# Patient Record
Sex: FEMALE | Race: BLACK OR AFRICAN AMERICAN | NOT HISPANIC OR LATINO | Employment: OTHER | URBAN - METROPOLITAN AREA
[De-identification: names, ages, dates, MRNs, and addresses within clinical notes are randomized per-mention and may not be internally consistent; named-entity substitution may affect disease eponyms.]

---

## 2025-03-08 ENCOUNTER — OFFICE VISIT (OUTPATIENT)
Dept: URGENT CARE | Age: 70
End: 2025-03-08
Payer: MEDICARE

## 2025-03-08 VITALS
RESPIRATION RATE: 20 BRPM | HEART RATE: 90 BPM | SYSTOLIC BLOOD PRESSURE: 142 MMHG | OXYGEN SATURATION: 99 % | TEMPERATURE: 97.2 F | DIASTOLIC BLOOD PRESSURE: 78 MMHG

## 2025-03-08 DIAGNOSIS — L03.011 CELLULITIS OF FINGER OF RIGHT HAND: ICD-10-CM

## 2025-03-08 DIAGNOSIS — L02.511 ABSCESS OF FINGER, RIGHT: Primary | ICD-10-CM

## 2025-03-08 RX ORDER — ATORVASTATIN CALCIUM 10 MG/1
10 TABLET, FILM COATED ORAL
COMMUNITY
Start: 2024-08-28 | End: 2025-09-03

## 2025-03-08 RX ORDER — DOXYCYCLINE 100 MG/1
100 CAPSULE ORAL 2 TIMES DAILY
Qty: 14 CAPSULE | Refills: 0 | Status: SHIPPED | OUTPATIENT
Start: 2025-03-08 | End: 2025-03-15

## 2025-03-08 RX ORDER — HYDROCHLOROTHIAZIDE 12.5 MG/1
12.5 CAPSULE ORAL
COMMUNITY
Start: 2024-03-15

## 2025-03-08 RX ORDER — MUPIROCIN 20 MG/G
OINTMENT TOPICAL
Qty: 22 G | Refills: 0 | Status: SHIPPED | OUTPATIENT
Start: 2025-03-08 | End: 2025-03-18

## 2025-03-08 RX ORDER — METFORMIN HYDROCHLORIDE 500 MG/1
1 TABLET ORAL
COMMUNITY
Start: 2024-05-20 | End: 2025-09-03

## 2025-03-08 RX ORDER — LOSARTAN POTASSIUM 100 MG/1
100 TABLET ORAL
COMMUNITY
Start: 2024-03-12 | End: 2025-09-03

## 2025-03-08 ASSESSMENT — ENCOUNTER SYMPTOMS
COLOR CHANGE: 1
FEVER: 0
CHILLS: 0

## 2025-03-08 NOTE — PROGRESS NOTES
Subjective   Patient ID: Ngoc Brown is a 70 y.o. female. They present today with a chief complaint of Finger Pain (X 1 day - pt has a sore on her rt pointer finger).    History of Present Illness  HPI    Patient presents to urgent care for complaints of a sore on her right pointer finger. Patient reports she had the sore for a couple of months. Reports yesterday she has had increasing redness and swelling around the sore. Denies any fever or chills.     Past Medical History  Allergies as of 03/08/2025 - Reviewed 03/08/2025   Allergen Reaction Noted    Amoxicillin Unknown 03/08/2025       (Not in a hospital admission)       Past Medical History:   Diagnosis Date    Acute embolism and thrombosis of unspecified deep veins of unspecified proximal lower extremity (Multi)     DVT of proximal lower limb    Personal history of other diseases of the circulatory system     History of hypertension       History reviewed. No pertinent surgical history.     reports that she has never smoked. She has never used smokeless tobacco.    Review of Systems  Review of Systems   Constitutional:  Negative for chills and fever.   Skin:  Positive for color change.                                  Objective    Vitals:    03/08/25 1717   BP: 142/78   Pulse: 90   Resp: 20   Temp: 36.2 °C (97.2 °F)   TempSrc: Temporal   SpO2: 99%     No LMP recorded. Patient is postmenopausal.    Physical Exam  Vitals reviewed.   Constitutional:       Appearance: Normal appearance.   Cardiovascular:      Rate and Rhythm: Normal rate and regular rhythm.      Heart sounds: Normal heart sounds.   Pulmonary:      Effort: Pulmonary effort is normal.      Breath sounds: Normal breath sounds.   Skin:     General: Skin is warm and dry.      Findings: Abscess (right pointer finger, may possible be a wart) present.      Comments: Erythema around sore and skin warm to the touch.    Neurological:      Mental Status: She is alert.         Procedures    Point of  Care Test & Imaging Results from this visit  No results found for this visit on 03/08/25.   No results found.    Diagnostic study results (if any) were reviewed by LI Campbell.    Assessment/Plan   Allergies, medications, history, and pertinent labs/EKGs/Imaging reviewed by LI Campbell.     Medical Decision Making    Will place patient on doxycycline and Bactroban ointment for acute cellulitis .  Referral was placed for for dermatology.  Patient was told to follow-up with dermatology if symptoms are not improving or worsening.    At time of discharge patient was clinically well-appearing and HDS for outpatient management. The patient and/or family was educated regarding diagnosis, supportive care, OTC and Rx medications. The patient and/or family was given the opportunity to ask questions prior to discharge.  They verbalized understanding of my discussion of the plans for treatment, expected course, indications to return to  or seek further evaluation in ED, and the need for timely follow up as directed.   They were provided with a work/school excuse if requested.    Orders and Diagnoses  Diagnoses and all orders for this visit:  Abscess of finger, right  -     Referral to Dermatology  Cellulitis of finger of right hand  -     mupirocin (Bactroban) 2 % ointment; Apply topically 3 times a day for 10 days.  -     doxycycline (Vibramycin) 100 mg capsule; Take 1 capsule (100 mg) by mouth 2 times a day for 7 days. Take with at least 8 ounces (large glass) of water, do not lie down for 30 minutes after      Medical Admin Record      Patient disposition: Home    Electronically signed by LI Campbell  5:27 PM